# Patient Record
Sex: MALE | Race: WHITE
[De-identification: names, ages, dates, MRNs, and addresses within clinical notes are randomized per-mention and may not be internally consistent; named-entity substitution may affect disease eponyms.]

---

## 2021-01-14 ENCOUNTER — HOSPITAL ENCOUNTER (EMERGENCY)
Dept: HOSPITAL 94 - ER | Age: 31
Discharge: LEFT BEFORE DISCHARGE | End: 2021-01-14
Payer: MEDICAID

## 2021-01-14 DIAGNOSIS — I10: Primary | ICD-10-CM

## 2021-01-14 DIAGNOSIS — Z53.21: ICD-10-CM

## 2021-01-15 ENCOUNTER — HOSPITAL ENCOUNTER (EMERGENCY)
Dept: HOSPITAL 94 - ER | Age: 31
LOS: 5 days | Discharge: PSYCHIATRIC OR SUBSTANCE USE FACILITY | End: 2021-01-20
Payer: MEDICAID

## 2021-01-15 VITALS — WEIGHT: 220.46 LBS | HEIGHT: 71 IN | BODY MASS INDEX: 30.86 KG/M2

## 2021-01-15 DIAGNOSIS — Z60.2: ICD-10-CM

## 2021-01-15 DIAGNOSIS — R41.82: ICD-10-CM

## 2021-01-15 DIAGNOSIS — Z20.828: ICD-10-CM

## 2021-01-15 DIAGNOSIS — F91.9: Primary | ICD-10-CM

## 2021-01-15 LAB
ALBUMIN SERPL BCP-MCNC: 3.5 G/DL (ref 3.4–5)
ALBUMIN/GLOB SERPL: 1.1 {RATIO} (ref 1.1–1.5)
ALP SERPL-CCNC: 75 IU/L (ref 46–116)
ALT SERPL W P-5'-P-CCNC: 83 U/L (ref 12–78)
AMPHETAMINES UR QL SCN: NEGATIVE
ANION GAP SERPL CALCULATED.3IONS-SCNC: 9 MMOL/L (ref 8–16)
AST SERPL W P-5'-P-CCNC: 78 U/L (ref 10–37)
BARBITURATES UR QL SCN: NEGATIVE
BASOPHILS # BLD AUTO: 0.1 X10'3 (ref 0–0.2)
BASOPHILS NFR BLD AUTO: 0.8 % (ref 0–1)
BENZODIAZ UR QL SCN: NEGATIVE
BILIRUB SERPL-MCNC: 0.4 MG/DL (ref 0.1–1)
BUN SERPL-MCNC: 32 MG/DL (ref 7–18)
BUN/CREAT SERPL: 34.4 (ref 5.4–32)
BZE UR QL SCN: NEGATIVE
CALCIUM SERPL-MCNC: 8.5 MG/DL (ref 8.5–10.1)
CANNABINOIDS UR QL SCN: NEGATIVE
CHLORIDE SERPL-SCNC: 104 MMOL/L (ref 99–107)
CLARITY UR: CLEAR
CO2 SERPL-SCNC: 27.7 MMOL/L (ref 24–32)
COLOR UR: YELLOW
CREAT SERPL-MCNC: 0.93 MG/DL (ref 0.6–1.1)
EOSINOPHIL # BLD AUTO: 0.1 X10'3 (ref 0–0.9)
EOSINOPHIL NFR BLD AUTO: 1.5 % (ref 0–6)
ERYTHROCYTE [DISTWIDTH] IN BLOOD BY AUTOMATED COUNT: 13.8 % (ref 11.5–14.5)
ETHANOL SERPL-MCNC: < 0.01 GM/DL (ref 0–0.01)
GFR SERPL CREATININE-BSD FRML MDRD: > 90 ML/MIN
GLUCOSE SERPL-MCNC: 118 MG/DL (ref 70–104)
GLUCOSE UR STRIP-MCNC: NEGATIVE MG/DL
HCT VFR BLD AUTO: 37.5 % (ref 42–52)
HGB BLD-MCNC: 12.7 G/DL (ref 14–17.9)
HGB UR QL STRIP: NEGATIVE
KETONES UR STRIP-MCNC: 15 MG/DL
LEUKOCYTE ESTERASE UR QL STRIP: NEGATIVE
LYMPHOCYTES # BLD AUTO: 1.2 X10'3 (ref 1.1–4.8)
LYMPHOCYTES NFR BLD AUTO: 11.8 % (ref 21–51)
MCH RBC QN AUTO: 29.8 PG (ref 27–31)
MCHC RBC AUTO-ENTMCNC: 33.8 G/DL (ref 33–36.5)
MCV RBC AUTO: 87.9 FL (ref 78–98)
METHADONE UR QL SCN: NEGATIVE
MONOCYTES # BLD AUTO: 0.9 X10'3 (ref 0–0.9)
MONOCYTES NFR BLD AUTO: 9.5 % (ref 2–12)
NEUTROPHILS # BLD AUTO: 7.6 X10'3 (ref 1.8–7.7)
NEUTROPHILS NFR BLD AUTO: 76.4 % (ref 42–75)
NITRITE UR QL STRIP: NEGATIVE
OPIATES UR QL SCN: NEGATIVE
PCP UR QL SCN: NEGATIVE
PH UR STRIP: 6 [PH] (ref 4.8–8)
PLATELET # BLD AUTO: 259 X10'3 (ref 140–440)
PMV BLD AUTO: 7.9 FL (ref 7.4–10.4)
POTASSIUM SERPL-SCNC: 3.4 MMOL/L (ref 3.5–5.1)
PROT SERPL-MCNC: 6.6 G/DL (ref 6.4–8.2)
PROT UR QL STRIP: NEGATIVE MG/DL
RBC # BLD AUTO: 4.26 X10'6 (ref 4.7–6.1)
SODIUM SERPL-SCNC: 141 MMOL/L (ref 135–145)
SP GR UR STRIP: 1.02 (ref 1–1.03)
URN COLLECT METHOD CLASS: (no result)
UROBILINOGEN UR STRIP-MCNC: 0.2 E.U/DL (ref 0.2–1)
WBC # BLD AUTO: 9.9 X10'3 (ref 4.5–11)

## 2021-01-15 PROCEDURE — 96372 THER/PROPH/DIAG INJ SC/IM: CPT

## 2021-01-15 PROCEDURE — 87635 SARS-COV-2 COVID-19 AMP PRB: CPT

## 2021-01-15 PROCEDURE — 80320 DRUG SCREEN QUANTALCOHOLS: CPT

## 2021-01-15 PROCEDURE — 81003 URINALYSIS AUTO W/O SCOPE: CPT

## 2021-01-15 PROCEDURE — 80053 COMPREHEN METABOLIC PANEL: CPT

## 2021-01-15 PROCEDURE — 80164 ASSAY DIPROPYLACETIC ACD TOT: CPT

## 2021-01-15 PROCEDURE — 36415 COLL VENOUS BLD VENIPUNCTURE: CPT

## 2021-01-15 PROCEDURE — 85025 COMPLETE CBC W/AUTO DIFF WBC: CPT

## 2021-01-15 PROCEDURE — 80305 DRUG TEST PRSMV DIR OPT OBS: CPT

## 2021-01-15 PROCEDURE — 99291 CRITICAL CARE FIRST HOUR: CPT

## 2021-01-15 PROCEDURE — 96360 HYDRATION IV INFUSION INIT: CPT

## 2021-01-15 PROCEDURE — 96361 HYDRATE IV INFUSION ADD-ON: CPT

## 2021-01-15 SDOH — SOCIAL STABILITY - SOCIAL INSECURITY: PROBLEMS RELATED TO LIVING ALONE: Z60.2
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
## 2021-01-16 NOTE — NUR
CONTACT INFO FOR BROTHER AND DAD



ISMA SHAIKH ( BROTHER) [phone]



HARLEY SHAIKH ( DAD) [phone]
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
## 2021-01-16 NOTE — NUR
Restraints removed. Patient is cooperative. He is ambulated to the bathroom 
with an ataxic gait. This patient accompanied patient to prevent fall. Patient 
voided and then was assisted back to bed.

## 2021-01-16 NOTE — NUR
Pt stood on bedside table and jumped off. RN asked pt to please not do this and 
explained the danger. RN requested meds from MD. Pt given Ativan and Risperdone 
per order.

## 2021-01-16 NOTE — NUR
pt awoke, confused, making nonsensical statements.  pt is stating that he is 
actually Jovanni.  Pt ripped his iv out partially, remaining piece removed.  Pt 
became intrusive with staff, friendly but only mildly cooperative.

## 2021-01-16 NOTE — NUR
Security remains at standy due to pt attempting to leave and being resistive to 
redirection. Security assisted pt back to bed, pt states he will "be good". Pt 
is making comments to staff like "can that girl be my security?" and "I love 
you" to the female staff.

## 2021-01-16 NOTE — NUR
Pt agitated, yelling, attempting to elope. Stopped by techs, security called 
over and assisted patient to his bed. Pt continued to be agitated, order 
recieved for chemical restraint, given with security on standby.

## 2021-01-16 NOTE — NUR
Pt went to bathroom and tech realised pt was playing with feces in toilet. Tech 
assisted pt to clean up and back to bed

## 2021-01-16 NOTE — NUR
Patient continues to resist against restraints. Ativan, Benadryl, and Haldol 
has been repeated IM. NAS RAPP

## 2021-01-16 NOTE — NUR
Patient is in four point restraints. He remains delusional. Patient reaches out 
to grab arms of staff. Good CSM's all extremities. Patient is beginning to calm 
somewhat. Patient is visable from nursing station.

## 2021-01-16 NOTE — NUR
Pt awake and eats breakfast. He displays erratic behavior, continues to state 
"annmarie rah", he holds papers on his head stating he "is trying to make a face 
mask". He is singing in his room and other pts ask him to stop as they are 
trying to sleep.

## 2021-01-16 NOTE — NUR
Patient is sleeping quietly, low fowlers position in bed. Two rails up, bed in 
low position. In direct view from nurses station.

## 2021-01-16 NOTE — NUR
[facility]: PATIENT DELUSIONAL AND UNABLE TO FORMULATE 
SAFETY PLAN OR HOW TO OBTAIN FOOD PER CLAY

## 2021-01-17 NOTE — NUR
Persistently up and down out of bed, redirects back to bed but has had mulitple 
attempts to run requiring security to get back
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
## 2021-01-17 NOTE — NUR
Patient is continuing to exhibit delusional behavior. He does not redirect 
well, patient reaches out to grab female nurses. He is somewhat agressive 
toward security at bedside. This writer will consult with ER MD for additional 
medications.

## 2021-01-17 NOTE — NUR
Patients bad behavior escelates. He postures against security and staff. 
Patient is not redirecting well.

## 2021-01-17 NOTE — NUR
Break RN, Pt got out of bed and walked out of the unit toward the Main ED. This 
RN told Pt to stop multiple times and Pt increased his speed. ED Techs and 
security responded and escorted Pt back to his bed.

## 2021-01-18 RX ADMIN — DIVALPROEX SODIUM SCH MG: 500 TABLET, DELAYED RELEASE ORAL at 17:03

## 2021-01-18 RX ADMIN — DIVALPROEX SODIUM SCH MG: 500 TABLET, DELAYED RELEASE ORAL at 20:11

## 2021-01-18 NOTE — NUR
Pt fell asleep for awhile.  Woke up for medications had trouble getting back to 
sleep, restless, kept getting up gait unsteady. Pt is easily redirectable to 
stay in bed.  Staff sitting close at this time to remind pt to stay in bed.

## 2021-01-18 NOTE — NUR
Pt continues to be restless.  Easily redirected back to bed.  Pt confused 
talked about needing to check on his brothers alarm.  Ambulated independantly 
to BR.

## 2021-01-18 NOTE — NUR
pt is up walking around. asking the same questions over and over. keeps trying 
to walk out of the unit

## 2021-01-18 NOTE — NUR
Pt up on unit restless at start of shift.  Aproached nurse station several 
times with mutiple requests.  Asking to be discharged.  At times saying he was 
suposed to met someone or he had something he had to do.  Easily redircted by 
being told he would be spending the night here.  



Pt maloderous cleaned self with bath wipes.  Laying down in bed at this time.

## 2021-01-19 VITALS — SYSTOLIC BLOOD PRESSURE: 140 MMHG | DIASTOLIC BLOOD PRESSURE: 87 MMHG

## 2021-01-19 RX ADMIN — DIVALPROEX SODIUM SCH MG: 500 TABLET, DELAYED RELEASE ORAL at 20:23

## 2021-01-19 RX ADMIN — DIVALPROEX SODIUM SCH MG: 500 TABLET, DELAYED RELEASE ORAL at 08:04

## 2021-01-19 NOTE — NUR
RESTRAINING ORDER: BROTHER ISMA HAS A RESTRAINING ORDER AGAINST PATIENT. 

DO NOT CALL HIM. 

PER BROTHER ISMA, PATIENT ATTACKED HIM AND ATTEMPTED TO BREAK INTO HIS HOUSE

## 2021-01-19 NOTE — NUR
Pt was escorted to shower by tech and two security officers. Pt was observed to 
smell extremely foul and tech observed a formed BM in shower, once shower was 
complete. Pt is now resting comfortably in bed.

## 2021-01-19 NOTE — NUR
Breaking primary RN, pt is up, walking to the bathrrom with his eyes closed, 
reminded that he needs to open them so that he doesn't fall

## 2021-01-20 RX ADMIN — DIVALPROEX SODIUM SCH MG: 500 TABLET, DELAYED RELEASE ORAL at 07:30

## 2021-01-20 NOTE — NUR
RN received phone call from [facility]. RN informed that pt. is being considered for 
admission to [facility].

## 2021-01-20 NOTE — NUR
Pt. transfered to [facility]. Pt. escorted with [facility] tech and . All 
belongings sent with pt.

## 2021-01-20 NOTE — NUR
1:1 interview done at bedside. Pt. denies SI/HI, A/V hallucinations. When asked 
what brought pt. to the hospital, pt. went on tangential, d/o story regarding 
buying cars for his grandma. Pt. reports being hospitalized in 2012 a [facility]. Pt. states, "I was going through a difficult time". 
During interview, pt. attempts to stand up and shayan RN. Pt. informed not to and 
followed instruction. Pt. lindsey RN. Pt. states, "Elaina Sir!".

## 2021-01-20 NOTE — NUR
sent RN on a 15min break the patient was asleep laying on his right side, his 
respirations appeared normal and was not in any distress at this time.
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
## 2021-01-20 NOTE — NUR
Pt. laying in bed awake in supine position. Pt. is calm. RN informed that pt. 
will be accepted to [facility] after lunch.

## 2021-01-20 NOTE — NUR
PT CONTINUES TO ATTEMPT TO GET OUT OF BED. STATES MULTIPLE DIFFERENT REASONS: 
"I NEED TO LEAVE." "I NEED TO GET MY BOOTS" "I NEED TO CALL MY BROTHER." PT 
REDIRECTED BACK TO BED MULTIPLE TIMES. PT CONTINUES TO SING AND YELL "EDOUARD-RAH." 
PT ASKED TO STOP THESE BEHAVIORS AND IS OCCASIONALLY COMPLIANT. KRISH LOVE 
MADE AWARE OF PT BEHAVIORS.
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
## 2021-02-09 ENCOUNTER — HOSPITAL ENCOUNTER (EMERGENCY)
Dept: HOSPITAL 94 - ER | Age: 31
LOS: 2 days | Discharge: PSYCHIATRIC OR SUBSTANCE USE FACILITY | End: 2021-02-11
Payer: MEDICAID

## 2021-02-09 VITALS — BODY MASS INDEX: 31.56 KG/M2 | WEIGHT: 220.46 LBS | HEIGHT: 70 IN

## 2021-02-09 DIAGNOSIS — F29: Primary | ICD-10-CM

## 2021-02-09 DIAGNOSIS — Z20.822: ICD-10-CM

## 2021-02-09 DIAGNOSIS — Z79.899: ICD-10-CM

## 2021-02-09 DIAGNOSIS — F31.9: ICD-10-CM

## 2021-02-09 LAB
ALBUMIN SERPL BCP-MCNC: 4.1 G/DL (ref 3.4–5)
ALBUMIN/GLOB SERPL: 1.1 {RATIO} (ref 1.1–1.5)
ALP SERPL-CCNC: 71 IU/L (ref 46–116)
ALT SERPL W P-5'-P-CCNC: 41 U/L (ref 12–78)
AMPHETAMINES UR QL SCN: NEGATIVE
ANION GAP SERPL CALCULATED.3IONS-SCNC: 14 MMOL/L (ref 8–16)
AST SERPL W P-5'-P-CCNC: 43 U/L (ref 10–37)
BARBITURATES UR QL SCN: NEGATIVE
BASOPHILS # BLD AUTO: 0 X10'3 (ref 0–0.2)
BASOPHILS NFR BLD AUTO: 0.2 % (ref 0–1)
BENZODIAZ UR QL SCN: NEGATIVE
BILIRUB SERPL-MCNC: 0.6 MG/DL (ref 0.1–1)
BUN SERPL-MCNC: 33 MG/DL (ref 7–18)
BUN/CREAT SERPL: 35.5 (ref 5.4–32)
BZE UR QL SCN: NEGATIVE
CALCIUM SERPL-MCNC: 8.4 MG/DL (ref 8.5–10.1)
CANNABINOIDS UR QL SCN: NEGATIVE
CHLORIDE SERPL-SCNC: 106 MMOL/L (ref 99–107)
CO2 SERPL-SCNC: 23.1 MMOL/L (ref 24–32)
CREAT SERPL-MCNC: 0.93 MG/DL (ref 0.6–1.1)
EOSINOPHIL # BLD AUTO: 0 X10'3 (ref 0–0.9)
EOSINOPHIL NFR BLD AUTO: 0.3 % (ref 0–6)
ERYTHROCYTE [DISTWIDTH] IN BLOOD BY AUTOMATED COUNT: 13.5 % (ref 11.5–14.5)
ETHANOL SERPL-MCNC: < 0.01 GM/DL (ref 0–0.01)
GFR SERPL CREATININE-BSD FRML MDRD: > 90 ML/MIN
GLUCOSE SERPL-MCNC: 108 MG/DL (ref 70–104)
HCT VFR BLD AUTO: 43.9 % (ref 42–52)
HGB BLD-MCNC: 14.4 G/DL (ref 14–17.9)
LYMPHOCYTES # BLD AUTO: 1.3 X10'3 (ref 1.1–4.8)
LYMPHOCYTES NFR BLD AUTO: 10.2 % (ref 21–51)
MCH RBC QN AUTO: 28.4 PG (ref 27–31)
MCHC RBC AUTO-ENTMCNC: 32.9 G/DL (ref 33–36.5)
MCV RBC AUTO: 86.3 FL (ref 78–98)
METHADONE UR QL SCN: NEGATIVE
MONOCYTES # BLD AUTO: 1.4 X10'3 (ref 0–0.9)
MONOCYTES NFR BLD AUTO: 11.4 % (ref 2–12)
NEUTROPHILS # BLD AUTO: 9.5 X10'3 (ref 1.8–7.7)
NEUTROPHILS NFR BLD AUTO: 77.9 % (ref 42–75)
OPIATES UR QL SCN: NEGATIVE
PCP UR QL SCN: NEGATIVE
PLATELET # BLD AUTO: 243 X10'3 (ref 140–440)
PMV BLD AUTO: 8.9 FL (ref 7.4–10.4)
POTASSIUM SERPL-SCNC: 3.6 MMOL/L (ref 3.5–5.1)
PROT SERPL-MCNC: 7.9 G/DL (ref 6.4–8.2)
RBC # BLD AUTO: 5.08 X10'6 (ref 4.7–6.1)
SODIUM SERPL-SCNC: 143 MMOL/L (ref 135–145)
WBC # BLD AUTO: 12.2 X10'3 (ref 4.5–11)

## 2021-02-09 PROCEDURE — 36415 COLL VENOUS BLD VENIPUNCTURE: CPT

## 2021-02-09 PROCEDURE — 85025 COMPLETE CBC W/AUTO DIFF WBC: CPT

## 2021-02-09 PROCEDURE — 87426 SARSCOV CORONAVIRUS AG IA: CPT

## 2021-02-09 PROCEDURE — 99285 EMERGENCY DEPT VISIT HI MDM: CPT

## 2021-02-09 PROCEDURE — 80305 DRUG TEST PRSMV DIR OPT OBS: CPT

## 2021-02-09 PROCEDURE — 96372 THER/PROPH/DIAG INJ SC/IM: CPT

## 2021-02-09 PROCEDURE — 80320 DRUG SCREEN QUANTALCOHOLS: CPT

## 2021-02-09 PROCEDURE — 80053 COMPREHEN METABOLIC PANEL: CPT

## 2021-02-09 RX ADMIN — OLANZAPINE SCH MG: 5 TABLET, ORALLY DISINTEGRATING ORAL at 20:10

## 2021-02-09 RX ADMIN — DIVALPROEX SODIUM SCH MG: 500 TABLET, DELAYED RELEASE ORAL at 20:10

## 2021-02-09 RX ADMIN — DIVALPROEX SODIUM SCH MG: 250 TABLET, DELAYED RELEASE ORAL at 20:10

## 2021-02-09 NOTE — NUR
Patient laying on left side sleeping.  Respirations are even and nonlabored.  
Will continue to monitor.

## 2021-02-09 NOTE — NUR
Pt undirectable and unable to follow direction. [facility] officers assisted 
getting pt changed into hospital attire and 4 point restraints applied. 
Provider Bruno NEWMAN updated and orders being placed.

## 2021-02-09 NOTE — NUR
Received pt in 4 pt restraints yelling uncontrollably.  Medicated pt with 
Haldol 5, Ativan 2 mg and Benadryl 50 mg.  Patient is now resting with lights 
dimmed.  VSS.

## 2021-02-10 RX ADMIN — OLANZAPINE SCH MG: 5 TABLET, ORALLY DISINTEGRATING ORAL at 08:18

## 2021-02-10 RX ADMIN — DIVALPROEX SODIUM SCH MG: 250 TABLET, DELAYED RELEASE ORAL at 08:18

## 2021-02-10 RX ADMIN — DIVALPROEX SODIUM SCH MG: 500 TABLET, DELAYED RELEASE ORAL at 08:18

## 2021-02-10 RX ADMIN — OLANZAPINE SCH MG: 5 TABLET, ORALLY DISINTEGRATING ORAL at 21:19

## 2021-02-10 RX ADMIN — DIVALPROEX SODIUM SCH MG: 250 TABLET, DELAYED RELEASE ORAL at 21:18

## 2021-02-10 RX ADMIN — OLANZAPINE SCH MG: 5 TABLET, ORALLY DISINTEGRATING ORAL at 12:57

## 2021-02-10 RX ADMIN — DIVALPROEX SODIUM SCH MG: 500 TABLET, DELAYED RELEASE ORAL at 21:18

## 2021-02-10 NOTE — NUR
PT JUST FINISHED BREAKFAST, ACCEPTING OF MEDICATIONS, NOW PACING AROUND, NO 
OTHER NEEDS AT THIS TIME, HE IS BOISTEROUS, VERY FRIENDLY
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
## 2021-02-10 NOTE — NUR
PHONE NUMBERS FOR PT:



ERINN (HARLEY): [phone]



ISMA (BROTHER): [phone]



AGUSTÍN (GRANDPA): [phone]



HOA (FRIEND): [phone]

## 2021-02-10 NOTE — NUR
RCVD REPORT FROM [facility], PT IS SUPINE IN BED, EYES CLOSED, ASLEEP, REGULAR 
BREATHING OBSERVED, NO NEEDS AT THIS TIME

## 2021-02-10 NOTE — NUR
pt isup and to the nurses station and back to his bed repeatedly, in other pts 
rooms, no needs at this time

## 2021-02-11 VITALS — SYSTOLIC BLOOD PRESSURE: 114 MMHG | DIASTOLIC BLOOD PRESSURE: 72 MMHG

## 2021-02-11 RX ADMIN — DIVALPROEX SODIUM SCH MG: 250 TABLET, DELAYED RELEASE ORAL at 19:53

## 2021-02-11 RX ADMIN — OLANZAPINE SCH MG: 5 TABLET, ORALLY DISINTEGRATING ORAL at 13:01

## 2021-02-11 RX ADMIN — OLANZAPINE SCH MG: 5 TABLET, ORALLY DISINTEGRATING ORAL at 08:59

## 2021-02-11 RX ADMIN — DIVALPROEX SODIUM SCH MG: 250 TABLET, DELAYED RELEASE ORAL at 08:59

## 2021-02-11 RX ADMIN — OLANZAPINE SCH MG: 5 TABLET, ORALLY DISINTEGRATING ORAL at 19:53

## 2021-02-11 RX ADMIN — DIVALPROEX SODIUM SCH MG: 500 TABLET, DELAYED RELEASE ORAL at 08:59

## 2021-02-11 RX ADMIN — DIVALPROEX SODIUM SCH MG: 500 TABLET, DELAYED RELEASE ORAL at 19:52

## 2021-02-11 NOTE — NUR
rcvd report from [facility] Sanders, pt was moved from room 8 in main ED to room 25 in 
overflow by tech, no needs at this time

## 2021-02-11 NOTE — NUR
PT IS WALKING AROUND STILL TRYING TO BE MANIC AFTER MEDICATIONS, APPEARS WORN 
OUT, STATES HE IS GOING TO BED

## 2021-02-11 NOTE — NUR
PT AT NURSING STATION ASKNG FOR A SNACK. PT JUST % OF LUNCH.  PT GIVEN A 
BANANA AND A SANDWICH FOR SNACK.

## 2021-02-11 NOTE — NUR
The patient has been pleasant but restless. He has been intrussive with others. 
He accepts redirection. He has been accepted at [facility].

## 2025-01-15 ENCOUNTER — HOSPITAL ENCOUNTER (EMERGENCY)
Dept: HOSPITAL 94 - ER | Age: 35
Discharge: PSYCHIATRIC OR SUBSTANCE USE FACILITY | End: 2025-01-15
Payer: MEDICAID

## 2025-01-15 VITALS
DIASTOLIC BLOOD PRESSURE: 75 MMHG | RESPIRATION RATE: 18 BRPM | SYSTOLIC BLOOD PRESSURE: 140 MMHG | HEART RATE: 86 BPM | OXYGEN SATURATION: 98 %

## 2025-01-15 VITALS — TEMPERATURE: 98.7 F

## 2025-01-15 VITALS — BODY MASS INDEX: 35 KG/M2 | WEIGHT: 244.49 LBS | HEIGHT: 70 IN

## 2025-01-15 DIAGNOSIS — F25.9: Primary | ICD-10-CM

## 2025-01-15 DIAGNOSIS — F29: ICD-10-CM

## 2025-01-15 DIAGNOSIS — F31.9: ICD-10-CM

## 2025-01-15 DIAGNOSIS — Z20.822: ICD-10-CM

## 2025-01-15 LAB
ALBUMIN SERPL BCP-MCNC: 3.8 G/DL (ref 3.4–5)
ALBUMIN/GLOB SERPL: 1 {RATIO} (ref 1.1–1.5)
ALP SERPL-CCNC: 74 IU/L (ref 46–116)
ALT SERPL W P-5'-P-CCNC: 125 U/L (ref 12–78)
AMPHETAMINES UR QL SCN: NEGATIVE
ANION GAP SERPL CALCULATED.3IONS-SCNC: 11 MMOL/L (ref 8–16)
AST SERPL W P-5'-P-CCNC: 149 U/L (ref 10–37)
BARBITURATES UR QL SCN: NEGATIVE
BASOPHILS # BLD AUTO: 0 X10'3 (ref 0–0.2)
BASOPHILS NFR BLD AUTO: 0.5 % (ref 0–1)
BENZODIAZ UR QL SCN: NEGATIVE
BILIRUB DIRECT SERPL-MCNC: 0.1 MG/DL (ref 0–0.3)
BILIRUB SERPL-MCNC: 0.3 MG/DL (ref 0.1–1)
BILIRUB UR QL STRIP: NEGATIVE
BUN SERPL-MCNC: 27 MG/DL (ref 7–18)
BUN/CREAT SERPL: 29 (ref 10–20)
BZE UR QL SCN: NEGATIVE
CALCIUM SERPL-MCNC: 9 MG/DL (ref 8.5–10.1)
CANNABINOIDS UR QL SCN: NEGATIVE
CHLORIDE SERPL-SCNC: 103 MMOL/L (ref 99–107)
CLARITY UR: CLEAR
CO2 SERPL-SCNC: 24.3 MMOL/L (ref 24–32)
COLOR UR: YELLOW
CREAT CL PREDICTED SERPL C-G-VRATE: 114 ML/MIN
CREAT SERPL-MCNC: 0.93 MG/DL (ref 0.6–1.1)
EOSINOPHIL # BLD AUTO: 0.1 X10'3 (ref 0–0.9)
EOSINOPHIL NFR BLD AUTO: 1 % (ref 0–6)
ERYTHROCYTE [DISTWIDTH] IN BLOOD BY AUTOMATED COUNT: 13.9 % (ref 11.5–14.5)
ETHANOL SERPL-MCNC: < 10 MG/DL (ref ?–10)
GFR SERPL CREATININE-BSD FRML MDRD: > 90 ML/MIN
GLOBULIN SER CALC-MCNC: 3.8 G/DL (ref 2.7–4.3)
GLUCOSE SERPL-MCNC: 191 MG/DL (ref 70–104)
GLUCOSE UR STRIP-MCNC: NEGATIVE MG/DL
HCT VFR BLD AUTO: 41.9 % (ref 42–52)
HGB BLD-MCNC: 14.5 G/DL (ref 14–17.9)
HGB UR QL STRIP: NEGATIVE
KETONES UR STRIP-MCNC: (no result) MG/DL
LEUKOCYTE ESTERASE UR QL STRIP: NEGATIVE
LYMPHOCYTES # BLD AUTO: 0.9 X10'3 (ref 1.1–4.8)
LYMPHOCYTES NFR BLD AUTO: 9.3 % (ref 21–51)
MCH RBC QN AUTO: 29.6 PG (ref 27–31)
MCHC RBC AUTO-ENTMCNC: 34.6 G/DL (ref 33–36.5)
MCV RBC AUTO: 85.5 FL (ref 78–98)
METHADONE UR QL SCN: NEGATIVE
MONOCYTES # BLD AUTO: 1 X10'3 (ref 0–0.9)
MONOCYTES NFR BLD AUTO: 10.2 % (ref 2–12)
NEUTROPHILS # BLD AUTO: 7.8 X10'3 (ref 1.8–7.7)
NEUTROPHILS NFR BLD AUTO: 79 % (ref 42–75)
NITRITE UR QL STRIP: NEGATIVE
OPIATES UR QL SCN: NEGATIVE
PCP UR QL SCN: NEGATIVE
PH UR STRIP: 7 [PH] (ref 4.8–8)
PLATELET # BLD AUTO: 297 X10'3 (ref 140–440)
PMV BLD AUTO: 8.3 FL (ref 7.4–10.4)
POTASSIUM SERPL-SCNC: 3.7 MMOL/L (ref 3.5–5.1)
PROT SERPL-MCNC: 7.6 G/DL (ref 6.4–8.2)
PROT UR QL STRIP: NEGATIVE MG/DL
RBC # BLD AUTO: 4.9 X10'6 (ref 4.7–6.1)
SODIUM SERPL-SCNC: 138 MMOL/L (ref 135–145)
SP GR UR STRIP: 1.01 (ref 1–1.03)
TSH SERPL DL<=0.05 MIU/L-ACNC: 2.02 ULU/ML (ref 0.34–4.5)
URN COLLECT METHOD CLASS: (no result)
UROBILINOGEN UR STRIP-MCNC: 0.2 E.U/DL (ref 0.2–1)
VALPROATE SERPL-MCNC: 66 UG/ML (ref 50–100)
WBC # BLD AUTO: 9.9 X10'3 (ref 4.5–11)

## 2025-01-15 PROCEDURE — 81003 URINALYSIS AUTO W/O SCOPE: CPT

## 2025-01-15 PROCEDURE — 80048 BASIC METABOLIC PNL TOTAL CA: CPT

## 2025-01-15 PROCEDURE — 80076 HEPATIC FUNCTION PANEL: CPT

## 2025-01-15 PROCEDURE — 87811 SARS-COV-2 COVID19 W/OPTIC: CPT

## 2025-01-15 PROCEDURE — 85025 COMPLETE CBC W/AUTO DIFF WBC: CPT

## 2025-01-15 PROCEDURE — 99285 EMERGENCY DEPT VISIT HI MDM: CPT

## 2025-01-15 PROCEDURE — 80305 DRUG TEST PRSMV DIR OPT OBS: CPT

## 2025-01-15 PROCEDURE — 84443 ASSAY THYROID STIM HORMONE: CPT

## 2025-01-15 PROCEDURE — 80320 DRUG SCREEN QUANTALCOHOLS: CPT

## 2025-01-15 PROCEDURE — 80164 ASSAY DIPROPYLACETIC ACD TOT: CPT

## 2025-01-15 PROCEDURE — 36415 COLL VENOUS BLD VENIPUNCTURE: CPT

## 2025-01-15 PROCEDURE — 96372 THER/PROPH/DIAG INJ SC/IM: CPT

## 2025-01-15 RX ADMIN — DIVALPROEX SODIUM SCH MG: 250 TABLET, EXTENDED RELEASE ORAL at 19:25

## 2025-01-15 RX ADMIN — OLANZAPINE SCH MG: 5 TABLET, FILM COATED ORAL at 19:25

## 2025-01-15 RX ADMIN — OLANZAPINE ONE MG: 10 INJECTION, POWDER, FOR SOLUTION INTRAMUSCULAR at 09:14
